# Patient Record
Sex: FEMALE | HISPANIC OR LATINO
[De-identification: names, ages, dates, MRNs, and addresses within clinical notes are randomized per-mention and may not be internally consistent; named-entity substitution may affect disease eponyms.]

---

## 2020-08-11 PROBLEM — Z00.00 ENCOUNTER FOR PREVENTIVE HEALTH EXAMINATION: Status: ACTIVE | Noted: 2020-08-11

## 2020-08-17 ENCOUNTER — APPOINTMENT (OUTPATIENT)
Dept: ENDOCRINOLOGY | Facility: CLINIC | Age: 35
End: 2020-08-17
Payer: COMMERCIAL

## 2020-08-17 VITALS
TEMPERATURE: 98.5 F | OXYGEN SATURATION: 99 % | WEIGHT: 123 LBS | HEIGHT: 60 IN | HEART RATE: 80 BPM | BODY MASS INDEX: 24.15 KG/M2 | DIASTOLIC BLOOD PRESSURE: 74 MMHG | SYSTOLIC BLOOD PRESSURE: 106 MMHG

## 2020-08-17 PROCEDURE — 99205 OFFICE O/P NEW HI 60 MIN: CPT

## 2020-08-17 RX ORDER — GLUC/MSM/COLGN2/HYAL/ANTIARTH3 375-375-20
TABLET ORAL
Refills: 0 | Status: ACTIVE | COMMUNITY

## 2020-08-17 NOTE — PHYSICAL EXAM
[Alert] : alert [Well Nourished] : well nourished [Healthy Appearance] : healthy appearance [No Acute Distress] : no acute distress [Normal Sclera/Conjunctiva] : normal sclera/conjunctiva [No Proptosis] : no proptosis [Normal Outer Ear/Nose] : the ears and nose were normal in appearance [No Neck Mass] : no neck mass was observed [Normal Hearing] : hearing was normal [Thyroid Not Enlarged] : the thyroid was not enlarged [No Respiratory Distress] : no respiratory distress [No Accessory Muscle Use] : no accessory muscle use [Normal Rate and Effort] : normal respiratory rate and effort [Clear to Auscultation] : lungs were clear to auscultation bilaterally [Normal S1, S2] : normal S1 and S2 [Regular Rhythm] : with a regular rhythm [Normal Rate] : heart rate was normal [No Edema] : no peripheral edema [Normal Bowel Sounds] : normal bowel sounds [Pedal Pulses Normal] : the pedal pulses are present [Not Tender] : non-tender [Not Distended] : not distended [Spine Straight] : spine straight [Soft] : abdomen soft [Normal Gait] : normal gait [No Involuntary Movements] : no involuntary movements were seen [No Clubbing, Cyanosis] : no clubbing  or cyanosis of the fingernails [No Motor Deficits] : the motor exam was normal [Normal Affect] : the affect was normal [No Tremors] : no tremors [Normal Mood] : the mood was normal [Normal Insight/Judgement] : insight and judgment were intact [Kyphosis] : no kyphosis present [Acanthosis Nigricans] : no acanthosis nigricans [Hirsutism] : no hirsutism [de-identified] : nodules not palpable

## 2020-08-17 NOTE — HISTORY OF PRESENT ILLNESS
[FreeTextEntry1] : chief complaint: subclinical hypothyroidism, goiter\par \par Patient is a 34 year old woman with a history of subclinical hypothyroidism 2/2 Hashimoto's here for evaluation of subclinical hypothyroidism and thyroid nodules. She was first told that her TSH was off in her early 30's and then in late 2019, she was started on LT4 25 mcg daily by her PMD for symptoms of fatigue. Dose was then adjusted to 25 mcg and 50 mcg on alternating days. She self discontinued the LT4 in the spring of 2020 because she did not notice any different on the medication. TFTs in 10/2019 were as follows: TSH 4.81 with free T4 of 0.9, with repeat TFTs in 12/2019 as follows: TSH 3.95 with free T4 of 1.0 (on LT4). She also had a thyroid ultrasound in 1/2020 which revealed the following: Right lobe 3.9 x 1.9 x 1.5 cm with 0.9 cm solid nodule in the mid portion; left lobe 4 x 1.6 x 1.4 cm, with 1.1 x 0.81 cm complex heterogenous nodule in the mid portion. Has never had a biopsy of the nodules. No history of surgery or RT to the neck. Her uncle has hypothyroidism. She does not have any neck complaints at this time. Her fatigue has improved on iron supplementation and after changing diet diet (she was previously vegetarian but is now eating meat). Has cold intolerance at baseline. Menses regular. No other symptoms at this time. She thinks her TPO titer was elevated in the past.\par \par She works a nurse care manager. She has two children, ages 12 and 18.

## 2020-08-17 NOTE — ASSESSMENT
[FreeTextEntry1] : 34 year old woman with subclinical hypothyroidism in setting of Hashimoto's and thyroid nodules\par \par 1. Subclinical hypothyroidism in setting of Hashimoto's:\par - appears clinically euthyroid today \par - will check TFTs today to ensure that she remains biochemically euthyroid\par - will also check TPO titer\par - we discussed that unless TSH is 10 or greater, can hold off on LT4 therapy given that she is feeling well\par \par 2. Thyroid nodules:\par - thyroid ultrasound results reviewed with patient (results were on her phone)\par - will recheck TFTs\par - does not require FNA\par - will repeat thyroid ultrasound early next year (1 year from last ultrasound)\par \par Return visit in 6 months

## 2020-08-17 NOTE — REVIEW OF SYSTEMS
[Cold Intolerance] : cold intolerance [All other systems negative] : All other systems negative [Fatigue] : no fatigue [Recent Weight Gain (___ Lbs)] : no recent weight gain [Chills] : no chills [Recent Weight Loss (___ Lbs)] : no recent weight loss [Fever] : no fever [Dysphonia] : no dysphonia [Dysphagia] : no dysphagia [Neck Pain] : no neck pain [Palpitations] : no palpitations [Chest Pain] : no chest pain [Lower Ext Edema] : no lower extremity edema [Shortness Of Breath] : no shortness of breath [Cough] : no cough [Nausea] : no nausea [Abdominal Pain] : no abdominal pain [Constipation] : no constipation [Diarrhea] : no diarrhea [Vomiting] : no vomiting [Dysuria] : no dysuria [Irregular Menses] : regular menses [Dry Skin] : no dry skin [Hair Loss] : no hair loss [Tremors] : no tremors [Headaches] : no headaches [Heat Intolerance] : no heat intolerance

## 2020-08-19 LAB
T4 FREE SERPL-MCNC: 1.1 NG/DL
THYROPEROXIDASE AB SERPL IA-ACNC: 1418 IU/ML
TSH SERPL-ACNC: 4.93 UIU/ML

## 2021-02-22 ENCOUNTER — APPOINTMENT (OUTPATIENT)
Dept: ENDOCRINOLOGY | Facility: CLINIC | Age: 36
End: 2021-02-22
Payer: COMMERCIAL

## 2021-02-22 VITALS
BODY MASS INDEX: 24.61 KG/M2 | WEIGHT: 126 LBS | OXYGEN SATURATION: 97 % | TEMPERATURE: 97.3 F | HEART RATE: 98 BPM | SYSTOLIC BLOOD PRESSURE: 110 MMHG | DIASTOLIC BLOOD PRESSURE: 70 MMHG

## 2021-02-22 PROCEDURE — 99214 OFFICE O/P EST MOD 30 MIN: CPT

## 2021-02-22 PROCEDURE — 99072 ADDL SUPL MATRL&STAF TM PHE: CPT

## 2021-02-22 RX ORDER — LEVOTHYROXINE SODIUM 0.03 MG/1
25 TABLET ORAL
Refills: 0 | Status: DISCONTINUED | COMMUNITY
End: 2021-02-22

## 2021-02-22 RX ORDER — SUMATRIPTAN SUCCINATE 100 MG/1
TABLET, FILM COATED ORAL
Refills: 0 | Status: ACTIVE | COMMUNITY

## 2021-02-22 NOTE — HISTORY OF PRESENT ILLNESS
[FreeTextEntry1] : chief complaint: subclinical hypothyroidism, thyroid nodules \par \par Patient is a 35 year old woman with a history of subclinical hypothyroidism 2/2 Hashimoto's and thyroid nodules here for follow up of subclinical hypothyroidism and thyroid nodules. She was first told that her TSH was off in her early 30's and then in late 2019, she was started on LT4 25 mcg daily by her PMD for symptoms of fatigue. Dose was then adjusted to 25 mcg and 50 mcg on alternating days. She self discontinued the LT4 in the spring of 2020 because she did not notice any different on the medication. Repeat TFTs since then largely consistent with mild subclinical hypothyroidism. Remains off LT4 at this time. Main sx at this time is fatigue, which typically occurs in the middle of the day and she will drink coffee. No neck complaints. No constipation, cold intolerance, hair changes; skin always dry. Has chronic headaches due to migraines, plans on following with her PCP and asking for referral for neurology for further management of her migraines. Menses regular. She is not planning on conception at this time. \par \par She also had a thyroid ultrasound in 1/2020 which revealed the following: Right lobe 3.9 x 1.9 x 1.5 cm with 0.9 cm solid nodule in the mid portion; left lobe 4 x 1.6 x 1.4 cm, with 1.1 x 0.81 cm complex heterogenous nodule in the mid portion. Has never had a biopsy of the nodules. No history of surgery or RT to the neck. Her uncle has hypothyroidism. She does not have any neck complaints at this time. 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

## 2021-02-22 NOTE — PHYSICAL EXAM
[Alert] : alert [Well Nourished] : well nourished [Healthy Appearance] : healthy appearance [No Acute Distress] : no acute distress [Normal Sclera/Conjunctiva] : normal sclera/conjunctiva [No Proptosis] : no proptosis [Normal Outer Ear/Nose] : the ears and nose were normal in appearance [Normal Hearing] : hearing was normal [No Neck Mass] : no neck mass was observed [Supple] : the neck was supple [Thyroid Not Enlarged] : the thyroid was not enlarged [No Respiratory Distress] : no respiratory distress [No Accessory Muscle Use] : no accessory muscle use [Normal Rate and Effort] : normal respiratory rate and effort [Clear to Auscultation] : lungs were clear to auscultation bilaterally [Normal S1, S2] : normal S1 and S2 [Normal Rate] : heart rate was normal [Regular Rhythm] : with a regular rhythm [No Edema] : no peripheral edema [Normal Bowel Sounds] : normal bowel sounds [Not Tender] : non-tender [Not Distended] : not distended [Soft] : abdomen soft [No Clubbing, Cyanosis] : no clubbing  or cyanosis of the fingernails [No Involuntary Movements] : no involuntary movements were seen [No Motor Deficits] : the motor exam was normal [No Tremors] : no tremors [Oriented x3] : oriented to person, place, and time [Normal Affect] : the affect was normal [Normal Insight/Judgement] : insight and judgment were intact [Normal Mood] : the mood was normal

## 2021-02-22 NOTE — REVIEW OF SYSTEMS
[Fatigue] : fatigue [Dry Skin] : dry skin [Headaches] : headaches [All other systems negative] : All other systems negative [Recent Weight Gain (___ Lbs)] : no recent weight gain [Recent Weight Loss (___ Lbs)] : no recent weight loss [Fever] : no fever [Chills] : no chills [Dysphagia] : no dysphagia [Neck Pain] : no neck pain [Dysphonia] : no dysphonia [Chest Pain] : no chest pain [Palpitations] : no palpitations [Lower Ext Edema] : no lower extremity edema [Shortness Of Breath] : no shortness of breath [Cough] : no cough [Nausea] : no nausea [Constipation] : no constipation [Abdominal Pain] : no abdominal pain [Vomiting] : no vomiting [Diarrhea] : no diarrhea [Irregular Menses] : regular menses [Hair Loss] : no hair loss [Tremors] : no tremors [Cold Intolerance] : no cold intolerance [Heat Intolerance] : no heat intolerance

## 2021-02-22 NOTE — ASSESSMENT
[FreeTextEntry1] : 35 year old woman with subclinical hypothyroidism in setting of Hashimoto's and thyroid nodules\par \par 1. Subclinical hypothyroidism in setting of Hashimoto's:\par - main sx today is fatigue, but overall she appears clinically euthyroid today \par - will check TFTs today to ensure that she remains biochemically euthyroid; recommended she also have TFTs checked again in 6 months with her PMD\par - we discussed that unless TSH is 10 or greater, can hold off on LT4 therapy given that she is feeling well\par - also instructed her that she should she decide to attempt conception or become pregnant, to call office immediately as goal TSH in this setting is less than 2.5 and she will need LT4 therapy \par \par 2. Thyroid nodules:\par - last thyroid ultrasound in 1/2020 revealed dominant 1.1 left thyroid nodule\par - will recheck TFTs\par - repeat thyroid ultrasound now and consider FNA as clinically indicated\par \par Return visit in 1 year or sooner if needed

## 2021-02-23 LAB
T4 FREE SERPL-MCNC: 0.9 NG/DL
TSH SERPL-ACNC: 4.43 UIU/ML

## 2022-12-01 ENCOUNTER — APPOINTMENT (OUTPATIENT)
Dept: ENDOCRINOLOGY | Facility: CLINIC | Age: 37
End: 2022-12-01

## 2022-12-14 ENCOUNTER — APPOINTMENT (OUTPATIENT)
Dept: ENDOCRINOLOGY | Facility: CLINIC | Age: 37
End: 2022-12-14

## 2022-12-14 VITALS
HEIGHT: 60 IN | WEIGHT: 121 LBS | SYSTOLIC BLOOD PRESSURE: 108 MMHG | BODY MASS INDEX: 23.75 KG/M2 | HEART RATE: 79 BPM | OXYGEN SATURATION: 98 % | DIASTOLIC BLOOD PRESSURE: 78 MMHG

## 2022-12-14 LAB
T3 SERPL-MCNC: 101 NG/DL
T4 FREE SERPL-MCNC: 1.1 NG/DL
TSH SERPL-ACNC: 6.23 UIU/ML

## 2022-12-14 PROCEDURE — 99214 OFFICE O/P EST MOD 30 MIN: CPT

## 2022-12-14 NOTE — HISTORY OF PRESENT ILLNESS
[FreeTextEntry1] : This si a 37 year old female with a history of subclinical hypothyroidism presenting for follow up management, currently at 5 weeks pregnancy.  Has not yet seen OB for initial visit.  she is currently on no thyroid replacement therapy. \par \par Thyroid Disease Course:\par History of subclinical hypothyroidism 2/2 Hashimoto's and thyroid nodules here for follow up of subclinical hypothyroidism and thyroid nodules. She was first told that her TSH was off in her early 30's and then in late 2019, she was started on LT4 25 mcg daily by her PMD for symptoms of fatigue. Dose was then adjusted to 25 mcg and 50 mcg on alternating days. She self discontinued the LT4 in the spring of 2020 because she did not notice any different on the medication. Repeat TFTs since then largely consistent with mild subclinical hypothyroidism. Remains off LT4 at this time. \par \par She also had a thyroid ultrasound in 1/2020 which revealed the following: Right lobe 3.9 x 1.9 x 1.5 cm with 0.9 cm solid nodule in the mid portion; left lobe 4 x 1.6 x 1.4 cm, with 1.1 x 0.81 cm complex heterogenous nodule in the mid portion. Has never had a biopsy of the nodules. No history of surgery or RT to the neck. Her uncle has hypothyroidism. She does not have any neck complaints at this time. \par \par Unable to view new US report from 3/19/22, I asked that the patient have the results faxed to the office. 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 \par \par ROS: she is feeling well, a little fatigued, but she notes she is anemic and b12 deficiency, so she takes iron supplement and multivitamin. Her multivitamin has biotin. Last menstrual period 11/9/22. \par Main sx at this time is fatigue, which typically occurs in the middle of the day and she will drink coffee. No neck complaints. No constipation, cold intolerance, hair changes; skin always dry. Has chronic headaches due to migraines.

## 2022-12-14 NOTE — REVIEW OF SYSTEMS
[Fatigue] : fatigue [All other systems negative] : All other systems negative [Recent Weight Gain (___ Lbs)] : no recent weight gain [Recent Weight Loss (___ Lbs)] : no recent weight loss [Fever] : no fever [Chills] : no chills [Dysphagia] : no dysphagia [Neck Pain] : no neck pain [Dysphonia] : no dysphonia [Chest Pain] : no chest pain [Palpitations] : no palpitations [Lower Ext Edema] : no lower extremity edema [Shortness Of Breath] : no shortness of breath [Cough] : no cough [Nausea] : no nausea [Constipation] : no constipation [Abdominal Pain] : no abdominal pain [Vomiting] : no vomiting [Diarrhea] : no diarrhea [Irregular Menses] : regular menses [Hair Loss] : no hair loss [Tremors] : no tremors [Cold Intolerance] : no cold intolerance [Heat Intolerance] : no heat intolerance

## 2022-12-14 NOTE — ASSESSMENT
[FreeTextEntry1] : 37 year old woman with subclinical hypothyroidism in setting of Hashimoto's and thyroid nodules\par \par 1. Subclinical hypothyroidism in setting of Hashimoto's:\par Discussed that the goal of LT4 treatment is to normalize maternal serum TSH values within the trimester-specific pregnancy reference range (first trimester, 0.1-2.5 mIU/L; second trimester, 0.2-3.0 mIU/L; third trimester,0.3-3.0 mIU/L)\par Check TFTs. \par Will consider initiation of 25-50 mcg daily pending TFT results. \par Discussed with patient there may be biotin in her current multivitamin that can interfere with thyroid results. \par \par Will check now as she cannot return to a lab in the near future, for subsequent testing she will hold the supplement for 4-5 days prior to testing. \par \par 2. Thyroid nodules:\par - last thyroid ultrasound in 1/2020 revealed dominant 1.1 left thyroid nodule\par - Last US report. 3/19/22: 1.2 cm left dominant nodule meeting biopsy criteria reviewed with patient on her phone. \par -Will fax me a copy of the report. \par \par Labs every 4 weeks.

## 2022-12-14 NOTE — PHYSICAL EXAM
[Alert] : alert [Well Nourished] : well nourished [Healthy Appearance] : healthy appearance [No Acute Distress] : no acute distress [Normal Outer Ear/Nose] : the ears and nose were normal in appearance [Normal Hearing] : hearing was normal [No Neck Mass] : no neck mass was observed [Supple] : the neck was supple [Thyroid Not Enlarged] : the thyroid was not enlarged [No Respiratory Distress] : no respiratory distress [No Accessory Muscle Use] : no accessory muscle use [Normal Rate and Effort] : normal respiratory rate and effort [Clear to Auscultation] : lungs were clear to auscultation bilaterally [Normal S1, S2] : normal S1 and S2 [Normal Rate] : heart rate was normal [Regular Rhythm] : with a regular rhythm [No Edema] : no peripheral edema [Normal Bowel Sounds] : normal bowel sounds [Not Tender] : non-tender [Not Distended] : not distended [Soft] : abdomen soft [Oriented x3] : oriented to person, place, and time [Normal Affect] : the affect was normal [Normal Insight/Judgement] : insight and judgment were intact [Normal Mood] : the mood was normal

## 2022-12-15 ENCOUNTER — NON-APPOINTMENT (OUTPATIENT)
Age: 37
End: 2022-12-15

## 2022-12-15 LAB
THYROGLOB AB SERPL-ACNC: <20 IU/ML
THYROPEROXIDASE AB SERPL IA-ACNC: 1239 IU/ML

## 2023-03-30 ENCOUNTER — APPOINTMENT (OUTPATIENT)
Dept: ENDOCRINOLOGY | Facility: CLINIC | Age: 38
End: 2023-03-30

## 2023-10-05 ENCOUNTER — APPOINTMENT (OUTPATIENT)
Dept: ENDOCRINOLOGY | Facility: CLINIC | Age: 38
End: 2023-10-05
Payer: COMMERCIAL

## 2023-10-05 VITALS
SYSTOLIC BLOOD PRESSURE: 100 MMHG | HEART RATE: 76 BPM | OXYGEN SATURATION: 99 % | BODY MASS INDEX: 24.54 KG/M2 | WEIGHT: 125 LBS | HEIGHT: 60 IN | DIASTOLIC BLOOD PRESSURE: 70 MMHG

## 2023-10-05 DIAGNOSIS — E04.1 NONTOXIC SINGLE THYROID NODULE: ICD-10-CM

## 2023-10-05 DIAGNOSIS — E03.8 OTHER SPECIFIED HYPOTHYROIDISM: ICD-10-CM

## 2023-10-05 DIAGNOSIS — E06.3 AUTOIMMUNE THYROIDITIS: ICD-10-CM

## 2023-10-05 DIAGNOSIS — N92.6 IRREGULAR MENSTRUATION, UNSPECIFIED: ICD-10-CM

## 2023-10-05 PROCEDURE — 99214 OFFICE O/P EST MOD 30 MIN: CPT

## 2023-10-06 LAB
ALBUMIN SERPL ELPH-MCNC: 4.6 G/DL
ALP BLD-CCNC: 66 U/L
ALT SERPL-CCNC: 11 U/L
ANION GAP SERPL CALC-SCNC: 12 MMOL/L
AST SERPL-CCNC: 17 U/L
BASOPHILS # BLD AUTO: 0.05 K/UL
BASOPHILS NFR BLD AUTO: 1 %
BILIRUB SERPL-MCNC: 0.5 MG/DL
BUN SERPL-MCNC: 13 MG/DL
CALCIUM SERPL-MCNC: 9.4 MG/DL
CHLORIDE SERPL-SCNC: 103 MMOL/L
CHOLEST SERPL-MCNC: 183 MG/DL
CO2 SERPL-SCNC: 25 MMOL/L
CREAT SERPL-MCNC: 0.97 MG/DL
EGFR: 76 ML/MIN/1.73M2
EOSINOPHIL # BLD AUTO: 0.11 K/UL
EOSINOPHIL NFR BLD AUTO: 2.2 %
ESTIMATED AVERAGE GLUCOSE: 108 MG/DL
ESTRADIOL SERPL-MCNC: 43 PG/ML
FERRITIN SERPL-MCNC: 19 NG/ML
FOLATE SERPL-MCNC: >20 NG/ML
FSH SERPL-MCNC: 7.5 IU/L
GLUCOSE SERPL-MCNC: 76 MG/DL
HBA1C MFR BLD HPLC: 5.4 %
HCG SERPL-MCNC: <1 MIU/ML
HCT VFR BLD CALC: 39.6 %
HDLC SERPL-MCNC: 62 MG/DL
HGB BLD-MCNC: 13 G/DL
IMM GRANULOCYTES NFR BLD AUTO: 0.2 %
IRON SATN MFR SERPL: 27 %
IRON SERPL-MCNC: 84 UG/DL
LDLC SERPL CALC-MCNC: 111 MG/DL
LH SERPL-ACNC: 5.2 IU/L
LYMPHOCYTES # BLD AUTO: 1.52 K/UL
LYMPHOCYTES NFR BLD AUTO: 30 %
MAN DIFF?: NORMAL
MCHC RBC-ENTMCNC: 29.9 PG
MCHC RBC-ENTMCNC: 32.8 GM/DL
MCV RBC AUTO: 91 FL
MONOCYTES # BLD AUTO: 0.35 K/UL
MONOCYTES NFR BLD AUTO: 6.9 %
NEUTROPHILS # BLD AUTO: 3.02 K/UL
NEUTROPHILS NFR BLD AUTO: 59.7 %
NONHDLC SERPL-MCNC: 121 MG/DL
PLATELET # BLD AUTO: 259 K/UL
POTASSIUM SERPL-SCNC: 4.2 MMOL/L
PROLACTIN SERPL-MCNC: 14.5 NG/ML
PROT SERPL-MCNC: 7.3 G/DL
RBC # BLD: 4.35 M/UL
RBC # FLD: 13.2 %
SODIUM SERPL-SCNC: 140 MMOL/L
T4 FREE SERPL-MCNC: 1.2 NG/DL
TESTOST FREE SERPL-MCNC: 0.7 PG/ML
TESTOST SERPL-MCNC: 20.9 NG/DL
TIBC SERPL-MCNC: 314 UG/DL
TRIGL SERPL-MCNC: 52 MG/DL
TSH SERPL-ACNC: 5.55 UIU/ML
UIBC SERPL-MCNC: 230 UG/DL
VIT B12 SERPL-MCNC: 345 PG/ML
WBC # FLD AUTO: 5.06 K/UL

## 2023-10-06 RX ORDER — LEVOTHYROXINE SODIUM 0.05 MG/1
50 TABLET ORAL DAILY
Qty: 90 | Refills: 3 | Status: ACTIVE | COMMUNITY
Start: 2022-12-14 | End: 1900-01-01

## 2024-04-15 ENCOUNTER — APPOINTMENT (OUTPATIENT)
Dept: ENDOCRINOLOGY | Facility: CLINIC | Age: 39
End: 2024-04-15

## 2025-08-11 ENCOUNTER — APPOINTMENT (OUTPATIENT)
Dept: ENDOCRINOLOGY | Facility: CLINIC | Age: 40
End: 2025-08-11